# Patient Record
(demographics unavailable — no encounter records)

---

## 2024-10-09 NOTE — ASSESSMENT
[FreeTextEntry1] : 76F presents for evaluation of trace blood in urine dip. There has been no evidence of true microscopic hematuria. Denies gross hematuria.   -Reviewed recent lab work with patient -UA/Ucx today -In the evidence of true microscopic hematuria, will work patient up based on 2020 AUA Microscopic Hematuria Guidelines.  -RTC in 12 months for repeat UA, sooner if UA today shows true microheme       Nydia Godfrey MD Chief of Urology, [address], [facility] [address] [place] [address] Phone: [phone] Fax: [phone]

## 2024-10-09 NOTE — HISTORY OF PRESENT ILLNESS
[None] : None [FreeTextEntry1] : Chief Complaint: Documented blood in urine  Date of first visit: 10/09/2024 Occupation: Retired    SUSI FORD is a 76-year-old  woman with a no PMHx who presents for evaluation of microscopic hematuria. As per patient, she has been told intermittently for the past several years that there has been trace blood noted in her urinalyses. Patient was seen by another urologist for same problem in August 2024 with negative findings. Patient had also gone to her cardiologist in June 2024 who performed a complete UA with 0 RBC noted. She denies history of gross hematuria. Denies history of renal stones. States she has had about 2-3 UTIs in her whole life. She is overall satisfied with her urinary health, denies urinary frequency or urgency. Denies vaginal pain/dryness or dyspareunia.    UA Hx 08/29/2024 1 RBC  Ucx Hx 08/29/2024 Negative   Urine Cytology: 8/29/24 Negative  PMHx: None SxHx: None FHx: No FHx of  [place] SocHx: Never smoker, denies exposure to secondhand smoke Allergies: NKDA   The patient denies fevers, chills, nausea and or vomiting and no unexplained weight loss. All pertinent laboratory, films and physician notes were reviewed.

## 2024-10-09 NOTE — PHYSICAL EXAM
[Normal Appearance] : normal appearance [Well Groomed] : well groomed [General Appearance - In No Acute Distress] : no acute distress [Edema] : no peripheral edema [Respiration, Rhythm And Depth] : normal respiratory rhythm and effort [Exaggerated Use Of Accessory Muscles For Inspiration] : no accessory muscle use [Abdomen Soft] : soft [Abdomen Tenderness] : non-tender [Costovertebral Angle Tenderness] : no ~M costovertebral angle tenderness [Urinary Bladder Findings] : the bladder was normal on palpation [Normal Station and Gait] : the gait and station were normal for the patient's age [] : no rash [No Focal Deficits] : no focal deficits [Oriented To Time, Place, And Person] : oriented to person, place, and time [Affect] : the affect was normal [Mood] : the mood was normal [General Appearance - Well Developed] : well developed [General Appearance - Well Nourished] : well nourished [Skin Color & Pigmentation] : normal skin color and pigmentation [Skin Turgor] : supple [Not Anxious] : not anxious

## 2024-10-09 NOTE — ASSESSMENT
[FreeTextEntry1] : 76F presents for evaluation of trace blood in urine dip. There has been no evidence of true microscopic hematuria. Denies gross hematuria.   -Reviewed recent lab work with patient -UA/Ucx today -In the evidence of true microscopic hematuria, will work patient up based on 2020 AUA Microscopic Hematuria Guidelines.  -RTC in 12 months for repeat UA, sooner if UA today shows true microheme       Nydia Godfrey MD Chief of Urology, [address], [facility] [address]tr[address] [place] [address] Phone: [phone] Fax: [phone]